# Patient Record
Sex: FEMALE | URBAN - METROPOLITAN AREA
[De-identification: names, ages, dates, MRNs, and addresses within clinical notes are randomized per-mention and may not be internally consistent; named-entity substitution may affect disease eponyms.]

---

## 2024-07-16 ENCOUNTER — HOME VISIT (OUTPATIENT)
Dept: POST ACUTE CARE | Facility: EXTERNAL LOCATION | Age: 74
End: 2024-07-16
Payer: MEDICARE

## 2024-07-16 DIAGNOSIS — E11.40 TYPE 2 DIABETES MELLITUS WITH DIABETIC NEUROPATHY, UNSPECIFIED WHETHER LONG TERM INSULIN USE (MULTI): ICD-10-CM

## 2024-07-16 DIAGNOSIS — I11.9 CARDIOMYOPATHY, HYPERTENSIVE, BENIGN, WITHOUT HEART FAILURE (MULTI): ICD-10-CM

## 2024-07-16 DIAGNOSIS — I48.91 ATRIAL FIBRILLATION, UNSPECIFIED TYPE (MULTI): ICD-10-CM

## 2024-07-16 DIAGNOSIS — I43 CARDIOMYOPATHY, HYPERTENSIVE, BENIGN, WITHOUT HEART FAILURE (MULTI): ICD-10-CM

## 2024-07-16 DIAGNOSIS — N18.30 STAGE 3 CHRONIC KIDNEY DISEASE, UNSPECIFIED WHETHER STAGE 3A OR 3B CKD (MULTI): ICD-10-CM

## 2024-07-16 DIAGNOSIS — F03.B0 MODERATE DEMENTIA, UNSPECIFIED DEMENTIA TYPE, UNSPECIFIED WHETHER BEHAVIORAL, PSYCHOTIC, OR MOOD DISTURBANCE OR ANXIETY (MULTI): Primary | ICD-10-CM

## 2024-07-16 DIAGNOSIS — I63.9 CEREBROVASCULAR ACCIDENT (CVA), UNSPECIFIED MECHANISM (MULTI): ICD-10-CM

## 2024-07-16 PROCEDURE — 99344 HOME/RES VST NEW MOD MDM 60: CPT | Performed by: EMERGENCY MEDICINE

## 2024-07-16 PROCEDURE — 99497 ADVNCD CARE PLAN 30 MIN: CPT | Performed by: EMERGENCY MEDICINE

## 2024-08-08 ENCOUNTER — HOME VISIT (OUTPATIENT)
Dept: POST ACUTE CARE | Facility: EXTERNAL LOCATION | Age: 74
End: 2024-08-08
Payer: MEDICARE

## 2024-08-08 DIAGNOSIS — F03.B0 MODERATE DEMENTIA, UNSPECIFIED DEMENTIA TYPE, UNSPECIFIED WHETHER BEHAVIORAL, PSYCHOTIC, OR MOOD DISTURBANCE OR ANXIETY (MULTI): ICD-10-CM

## 2024-08-08 DIAGNOSIS — E11.40 TYPE 2 DIABETES MELLITUS WITH DIABETIC NEUROPATHY, UNSPECIFIED WHETHER LONG TERM INSULIN USE (MULTI): Primary | ICD-10-CM

## 2024-08-08 DIAGNOSIS — I63.9 CEREBROVASCULAR ACCIDENT (CVA), UNSPECIFIED MECHANISM (MULTI): ICD-10-CM

## 2024-08-08 DIAGNOSIS — I48.91 ATRIAL FIBRILLATION, UNSPECIFIED TYPE (MULTI): ICD-10-CM

## 2024-08-17 NOTE — PROGRESS NOTES
Augustina Cande   73 y.o.  female  @location@            Assessment and Plan:      Moderate to advanced dementia  Past history of stroke  Hypertensive heart disease  CKD stage III  Type 2 diabetes  Paroxysmal A-fib  Osteoporosis    Continue alendronate for osteoporosis    Anticoagulated for A-fib with Eliquis.  Monitor for bleeding    On aspirin for past history of stroke.  Monitor neurological status    Hyperlipidemia-on Lipitor.  Monitor lipid panel    Continue memantine for dementia    Anxiety and depression-on Zoloft    Hypertension-controlled with Cardizem.  Monitor and treat accordingly    -Fall prevention    -Cognitive engagement     -Monitor and treat blood pressure     -Aggressive decubitus ulcer prevention.     -Bowel and bladder care     -Optimal nutrition and supplementation as needed     -GI  and DVT prophylaxis     -Symptom control     -Ambulation as tolerated     -Will follow    Charting is done using voice recognition software and may contain errors which have not been completely corrected        Source of history: Nurse, Medical personnel, Medical record, Patient.  History limitation: None.    HPI:      Patient is unable to give any detailed history and therefore history is obtained from the chart  No acute complaints voiced by the patient or acute concerns raised by nursing    Problem List/Past Medical History Ongoing Degenerative disc disease at L5-S1 level Hyperlipidemia Hypertension Iron deficiency anemia Mild renal insufficiency Osteopenia of hip Postmenopausal status Type 2 diabetes mellitus Historical   No qualifying data Procedure/Surgical History Right carotid endarterectomy: 01/01/10  Cataracts removed bilaterally Medications alendronate 70 mg oral tablet, 70 mg= 1 tabs, ORAL, TUESDAY aspirin 81 mg oral tablet, DAILY Caltrate, ORAL, DAILY Cartia  mg/24 hours oral capsule, extended release, 240 mg= 1 caps, ORAL, DAILY, 1 refills Fish Oil oral capsule, DAILY Glucometer test strips for BID  testing., See Instructions, 11 refills Lipitor 40 mg oral tablet, 40 mg= 1 tabs, ORAL, DAILY, 1 refills metFORMIN 500 mg oral tablet, 1 tabs, ORAL, BID multivitamin, ORAL, DAILY Vitamin B-12, ORAL, DAILY Vitamin D, ORAL, DAILY Allergies No Known Allergies Social History   Alcohol - Denies Alcohol Use, 11/21/2017   Domestic Concerns   No, 11/21/2017   Employment/School   Retired, Work/School description: ., 11/21/2017   Exercise   Exercise type: Aerobics., 11/21/2017   Home/Environment   Lives with Siblings. Living situation: Home/Independent. Home equipment: None, Blood pressure monitor. Home monitoring equipment: None. Special/Community resources: None. Mobility prior to admit: Independent. Home Barriers: None. Will patient require additional/new services upon discharge? No., 11/21/2017    Family History Alcoholism: Father. Alzheimer disease: Mother and Grandmother. Diabetes..: Mother. High blood pressure....: Brother. Immunizations   Vaccine Date Status Comments   pneumococcal 13-valent vaccine 03/16/2020 Given    influenza virus vaccine, inactivated 11/22/2019 Recorded    influenza virus vaccine, inactivated 11/20/2018 Recorded    diphtheria/pertussis, acel/tetanus adult 05/21/2018 Given exp date not right in system, exp date is June 13, 2020   influenza virus vaccine, inactivated 11/21/2017 Given    influenza virus vaccine, inactivated 12/19/2014 Recorded    .   Physical Exam:  Vital signs as per nursing/MA documentation were reviewed  General appearance: Alert and in no acute distress  HEENT: Normal Inspection  Neck - Normal Inspection  Respiratory : No respiratory distress. Lungs are clear   Cardiovascular: heart rate normal. No gallop  Back - normal inspection  Skin inspection:Warm  Musculoskeletal : No deformities  Neuro : Limited exam. Baseline    ROS: Review of symptoms is negative except for what is mentioned in HPI    Results/Data  Records including but not limited to electronic medical  records, relevant lab work and imaging from patient's health care facility encounter were reviewed and independently verified      Charting was completed using voice recognition technology and may include unintended errors.    Discussed with patient/family, RN, and NP.

## 2024-09-17 ENCOUNTER — NURSING HOME VISIT (OUTPATIENT)
Dept: POST ACUTE CARE | Facility: EXTERNAL LOCATION | Age: 74
End: 2024-09-17
Payer: MEDICARE

## 2024-09-17 DIAGNOSIS — I48.91 ATRIAL FIBRILLATION, UNSPECIFIED TYPE (MULTI): ICD-10-CM

## 2024-09-17 DIAGNOSIS — E11.40 TYPE 2 DIABETES MELLITUS WITH DIABETIC NEUROPATHY, UNSPECIFIED WHETHER LONG TERM INSULIN USE (MULTI): ICD-10-CM

## 2024-09-17 DIAGNOSIS — F03.B0 MODERATE DEMENTIA, UNSPECIFIED DEMENTIA TYPE, UNSPECIFIED WHETHER BEHAVIORAL, PSYCHOTIC, OR MOOD DISTURBANCE OR ANXIETY: ICD-10-CM

## 2024-09-17 DIAGNOSIS — I63.9 CEREBROVASCULAR ACCIDENT (CVA), UNSPECIFIED MECHANISM (MULTI): ICD-10-CM

## 2024-09-17 DIAGNOSIS — Z00.00 ENCOUNTER FOR MEDICARE ANNUAL WELLNESS EXAM: Primary | ICD-10-CM

## 2024-09-17 PROCEDURE — 99349 HOME/RES VST EST MOD MDM 40: CPT | Performed by: EMERGENCY MEDICINE

## 2024-09-17 PROCEDURE — G0439 PPPS, SUBSEQ VISIT: HCPCS | Performed by: EMERGENCY MEDICINE

## 2024-09-17 PROCEDURE — 99397 PER PM REEVAL EST PAT 65+ YR: CPT | Performed by: EMERGENCY MEDICINE

## 2024-09-17 PROCEDURE — 99497 ADVNCD CARE PLAN 30 MIN: CPT | Performed by: EMERGENCY MEDICINE

## 2024-09-17 NOTE — LETTER
Patient: Augustina Castellon  : 1950    Encounter Date: 2024    Augustina Castellon   74 y.o.  female  @location@    Patient is being seen for subsequent Medicare wellness and/or physical    Past Medical, Surgical and Family History: reviewed and updated in chart.   Medications and Supplements: Review of all medications by a prescribing practitioner or clinical pharmacist (such as prescriptions, OTCs, herbal therapies and supplements) documented in the medical record.    No, the patient is not using opioids.   Patient Self Assessment of Health Status: fair.   Tobacco use: Non-User The patient is a former cigarette smoker.   Alcohol use: As noted in social history   Illicit drug use: As noted in social history   Current diet: well balanced diet.   Exercise Frequency: the patient does not exercise.   Depression/Suicide Screening:  .   During the past 2 weeks, the patient has not felt down, depressed or hopeless.    During the past 2 weeks, the patient has not felt little interest or pleasure in doing things.    Hearing Impairment: Patient has slight hearing impairment.   Cognitive Impairment: Cognitive impairment was observed.      Bathing: dependent.   Dressing: dependent.   Walking: dependent.   Toileting: dependent.   Feeding: dependent.   Personal Hygiene: dependent.   Managing Finances: dependent.   Shopping: dependent.   Managing Medications: dependent.   Housework / Basic Home Maintenance: dependent.   Handling Transportation: dependent.   Preparing Meals: dependent.   Using the Telephone/ Communication Devices: dependent.    Falls Risk Screening:. Has not fallen in the last 6 months.   Home safety risk factors: none.   Advance directives:. Advanced Care Planning discussed and documented advance care plan or surrogate decision maker documented in the medical record.   A Conversation about Advance directives of at least 16 minutes has occurred. Actual time spent: 20   Topics discussed: Code status per nursing  documentation filed with facility.              Assessment and Plan:      Moderate to advanced dementia  Past history of stroke  Hypertensive heart disease  CKD stage III  Type 2 diabetes  Paroxysmal A-fib  Osteoporosis    Continue alendronate for osteoporosis    Anticoagulated for A-fib with Eliquis.  Monitor for bleeding    On aspirin for past history of stroke.  Monitor neurological status    Hyperlipidemia-on Lipitor.  Monitor lipid panel    Continue memantine for dementia    Anxiety and depression-on Zoloft    Hypertension-controlled with Cardizem.  Monitor and treat accordingly    Provide safe environment for the patient    Continue current medication regimen    OT PT and speech therapy    Bowel and bladder,skin care    Nutritional support    Monitor and treat blood glucose    GI  and DVT prophylaxis    PRN medications    Periodic lab work    Regular Follow up          Source of history: Nurse, Medical personnel, Medical record, Patient.  History limitation: None.    HPI:      Patient is unable to give any detailed history and therefore history is obtained from the chart  No acute complaints voiced by the patient or acute concerns raised by nursing    Problem List/Past Medical History Ongoing Degenerative disc disease at L5-S1 level Hyperlipidemia Hypertension Iron deficiency anemia Mild renal insufficiency Osteopenia of hip Postmenopausal status Type 2 diabetes mellitus Historical   No qualifying data Procedure/Surgical History Right carotid endarterectomy: 01/01/10  Cataracts removed bilaterally Medications alendronate 70 mg oral tablet, 70 mg= 1 tabs, ORAL, TUESDAY aspirin 81 mg oral tablet, DAILY Caltrate, ORAL, DAILY Cartia  mg/24 hours oral capsule, extended release, 240 mg= 1 caps, ORAL, DAILY, 1 refills Fish Oil oral capsule, DAILY Glucometer test strips for BID testing., See Instructions, 11 refills Lipitor 40 mg oral tablet, 40 mg= 1 tabs, ORAL, DAILY, 1 refills metFORMIN 500 mg oral tablet, 1  tabs, ORAL, BID multivitamin, ORAL, DAILY Vitamin B-12, ORAL, DAILY Vitamin D, ORAL, DAILY Allergies No Known Allergies Social History   Alcohol - Denies Alcohol Use, 11/21/2017   Domestic Concerns   No, 11/21/2017   Employment/School   Retired, Work/School description: ., 11/21/2017   Exercise   Exercise type: Aerobics., 11/21/2017   Home/Environment   Lives with Siblings. Living situation: Home/Independent. Home equipment: None, Blood pressure monitor. Home monitoring equipment: None. Special/Community resources: None. Mobility prior to admit: Independent. Home Barriers: None. Will patient require additional/new services upon discharge? No., 11/21/2017    Family History Alcoholism: Father. Alzheimer disease: Mother and Grandmother. Diabetes..: Mother. High blood pressure....: Brother. Immunizations   Vaccine Date Status Comments   pneumococcal 13-valent vaccine 03/16/2020 Given    influenza virus vaccine, inactivated 11/22/2019 Recorded    influenza virus vaccine, inactivated 11/20/2018 Recorded    diphtheria/pertussis, acel/tetanus adult 05/21/2018 Given exp date not right in system, exp date is June 13, 2020   influenza virus vaccine, inactivated 11/21/2017 Given    influenza virus vaccine, inactivated 12/19/2014 Recorded    .   Physical Exam:  Vital signs as per nursing/MA documentation were reviewed  General appearance: Alert and in no acute distress  HEENT: Normal Inspection  Neck - Normal Inspection  Respiratory : No respiratory distress. Lungs are clear   Cardiovascular: heart rate normal. No gallop  Back - normal inspection  Skin inspection:Warm  Musculoskeletal : No deformities  Neuro : Limited exam. Baseline    ROS: Review of symptoms is negative except for what is mentioned in HPI    Results/Data  Records including but not limited to electronic medical records, relevant lab work and imaging from patient's health care facility encounter were reviewed and independently verified      Charting was  completed using voice recognition technology and may include unintended errors.    Discussed with patient/family, RN, and NP.      Electronically Signed By: Reddy Taylor MD   9/24/24  4:42 PM

## 2024-09-19 NOTE — PROGRESS NOTES
Augustina Castellon   74 y.o.  female  @location@    Patient is being seen for subsequent Medicare wellness and/or physical    Past Medical, Surgical and Family History: reviewed and updated in chart.   Medications and Supplements: Review of all medications by a prescribing practitioner or clinical pharmacist (such as prescriptions, OTCs, herbal therapies and supplements) documented in the medical record.    No, the patient is not using opioids.   Patient Self Assessment of Health Status: fair.   Tobacco use: Non-User The patient is a former cigarette smoker.   Alcohol use: As noted in social history   Illicit drug use: As noted in social history   Current diet: well balanced diet.   Exercise Frequency: the patient does not exercise.   Depression/Suicide Screening:  .   During the past 2 weeks, the patient has not felt down, depressed or hopeless.    During the past 2 weeks, the patient has not felt little interest or pleasure in doing things.    Hearing Impairment: Patient has slight hearing impairment.   Cognitive Impairment: Cognitive impairment was observed.      Bathing: dependent.   Dressing: dependent.   Walking: dependent.   Toileting: dependent.   Feeding: dependent.   Personal Hygiene: dependent.   Managing Finances: dependent.   Shopping: dependent.   Managing Medications: dependent.   Housework / Basic Home Maintenance: dependent.   Handling Transportation: dependent.   Preparing Meals: dependent.   Using the Telephone/ Communication Devices: dependent.    Falls Risk Screening:. Has not fallen in the last 6 months.   Home safety risk factors: none.   Advance directives:. Advanced Care Planning discussed and documented advance care plan or surrogate decision maker documented in the medical record.   A Conversation about Advance directives of at least 16 minutes has occurred. Actual time spent: 20   Topics discussed: Code status per nursing documentation filed with facility.              Assessment and  Plan:      Moderate to advanced dementia  Past history of stroke  Hypertensive heart disease  CKD stage III  Type 2 diabetes  Paroxysmal A-fib  Osteoporosis    Continue alendronate for osteoporosis    Anticoagulated for A-fib with Eliquis.  Monitor for bleeding    On aspirin for past history of stroke.  Monitor neurological status    Hyperlipidemia-on Lipitor.  Monitor lipid panel    Continue memantine for dementia    Anxiety and depression-on Zoloft    Hypertension-controlled with Cardizem.  Monitor and treat accordingly    Provide safe environment for the patient    Continue current medication regimen    OT PT and speech therapy    Bowel and bladder,skin care    Nutritional support    Monitor and treat blood glucose    GI  and DVT prophylaxis    PRN medications    Periodic lab work    Regular Follow up          Source of history: Nurse, Medical personnel, Medical record, Patient.  History limitation: None.    HPI:      Patient is unable to give any detailed history and therefore history is obtained from the chart  No acute complaints voiced by the patient or acute concerns raised by nursing    Problem List/Past Medical History Ongoing Degenerative disc disease at L5-S1 level Hyperlipidemia Hypertension Iron deficiency anemia Mild renal insufficiency Osteopenia of hip Postmenopausal status Type 2 diabetes mellitus Historical   No qualifying data Procedure/Surgical History Right carotid endarterectomy: 01/01/10  Cataracts removed bilaterally Medications alendronate 70 mg oral tablet, 70 mg= 1 tabs, ORAL, TUESDAY aspirin 81 mg oral tablet, DAILY Caltrate, ORAL, DAILY Cartia  mg/24 hours oral capsule, extended release, 240 mg= 1 caps, ORAL, DAILY, 1 refills Fish Oil oral capsule, DAILY Glucometer test strips for BID testing., See Instructions, 11 refills Lipitor 40 mg oral tablet, 40 mg= 1 tabs, ORAL, DAILY, 1 refills metFORMIN 500 mg oral tablet, 1 tabs, ORAL, BID multivitamin, ORAL, DAILY Vitamin B-12, ORAL,  DAILY Vitamin D, ORAL, DAILY Allergies No Known Allergies Social History   Alcohol - Denies Alcohol Use, 11/21/2017   Domestic Concerns   No, 11/21/2017   Employment/School   Retired, Work/School description: ., 11/21/2017   Exercise   Exercise type: Aerobics., 11/21/2017   Home/Environment   Lives with Siblings. Living situation: Home/Independent. Home equipment: None, Blood pressure monitor. Home monitoring equipment: None. Special/Community resources: None. Mobility prior to admit: Independent. Home Barriers: None. Will patient require additional/new services upon discharge? No., 11/21/2017    Family History Alcoholism: Father. Alzheimer disease: Mother and Grandmother. Diabetes..: Mother. High blood pressure....: Brother. Immunizations   Vaccine Date Status Comments   pneumococcal 13-valent vaccine 03/16/2020 Given    influenza virus vaccine, inactivated 11/22/2019 Recorded    influenza virus vaccine, inactivated 11/20/2018 Recorded    diphtheria/pertussis, acel/tetanus adult 05/21/2018 Given exp date not right in system, exp date is June 13, 2020   influenza virus vaccine, inactivated 11/21/2017 Given    influenza virus vaccine, inactivated 12/19/2014 Recorded    .   Physical Exam:  Vital signs as per nursing/MA documentation were reviewed  General appearance: Alert and in no acute distress  HEENT: Normal Inspection  Neck - Normal Inspection  Respiratory : No respiratory distress. Lungs are clear   Cardiovascular: heart rate normal. No gallop  Back - normal inspection  Skin inspection:Warm  Musculoskeletal : No deformities  Neuro : Limited exam. Baseline    ROS: Review of symptoms is negative except for what is mentioned in HPI    Results/Data  Records including but not limited to electronic medical records, relevant lab work and imaging from patient's health care facility encounter were reviewed and independently verified      Charting was completed using voice recognition technology and may include  unintended errors.    Discussed with patient/family, RN, and NP.

## 2024-10-15 ENCOUNTER — NURSING HOME VISIT (OUTPATIENT)
Dept: POST ACUTE CARE | Facility: EXTERNAL LOCATION | Age: 74
End: 2024-10-15
Payer: COMMERCIAL

## 2024-10-15 DIAGNOSIS — I48.91 ATRIAL FIBRILLATION, UNSPECIFIED TYPE (MULTI): ICD-10-CM

## 2024-10-15 DIAGNOSIS — I11.9 CARDIOMYOPATHY, HYPERTENSIVE, BENIGN, WITHOUT HEART FAILURE: ICD-10-CM

## 2024-10-15 DIAGNOSIS — F03.B0 MODERATE DEMENTIA, UNSPECIFIED DEMENTIA TYPE, UNSPECIFIED WHETHER BEHAVIORAL, PSYCHOTIC, OR MOOD DISTURBANCE OR ANXIETY: ICD-10-CM

## 2024-10-15 DIAGNOSIS — I63.9 CEREBROVASCULAR ACCIDENT (CVA), UNSPECIFIED MECHANISM (MULTI): Primary | ICD-10-CM

## 2024-10-15 DIAGNOSIS — I43 CARDIOMYOPATHY, HYPERTENSIVE, BENIGN, WITHOUT HEART FAILURE: ICD-10-CM

## 2024-10-15 PROCEDURE — 99348 HOME/RES VST EST LOW MDM 30: CPT | Performed by: EMERGENCY MEDICINE

## 2024-10-15 NOTE — LETTER
Patient: Augustina Castellon  : 1950    Encounter Date: 10/15/2024    Augustina Castellon   74 y.o.  female  @location@            Assessment and Plan:      Moderate to advanced dementia  Past history of stroke  Hypertensive heart disease  CKD stage III  Type 2 diabetes  Paroxysmal A-fib  Osteoporosis    Continue alendronate for osteoporosis    Anticoagulated for A-fib with Eliquis.  Monitor for bleeding    On aspirin for past history of stroke.  Monitor neurological status    Hyperlipidemia-on Lipitor.  Monitor lipid panel    Continue memantine for dementia    Anxiety and depression-on Zoloft    Hypertension-controlled with Cardizem.  Monitor and treat accordingly    Rx list reviewed.   PT and OT evaluation is in the process.   Routine safety measures, fall precautions, risk modification and alarm placement if needed for prevention of falls.   Skin care precautions, prevention of pressures sores at pressure points assessed.   Pt needs to be monitored frequently by nursing staff particularly at night time.   Any confusion, agitation or behavioural disturbance needs to be attended, as per home policy   rapid covid Ag assay need to be done, notify if positive.   If needed appropriate measures to be taken for alarm placements and assisted devices, pt was told not to get up and ambulate at night unless help and assist available at bedside,   labs will be done as per our routine protocol.   PO intake need to be monitored if consuming po.       Charting is done using voice recognition software and may contain errors which have not been completely corrected          Source of history: Nurse, Medical personnel, Medical record, Patient.  History limitation: None.    HPI:      Patient is unable to give any detailed history and therefore history is obtained from the chart  No acute complaints voiced by the patient or acute concerns raised by nursing    Problem List/Past Medical History Ongoing Degenerative disc disease at L5-S1  level Hyperlipidemia Hypertension Iron deficiency anemia Mild renal insufficiency Osteopenia of hip Postmenopausal status Type 2 diabetes mellitus Historical   No qualifying data Procedure/Surgical History Right carotid endarterectomy: 01/01/10  Cataracts removed bilaterally Medications alendronate 70 mg oral tablet, 70 mg= 1 tabs, ORAL, TUESDAY aspirin 81 mg oral tablet, DAILY Caltrate, ORAL, DAILY Cartia  mg/24 hours oral capsule, extended release, 240 mg= 1 caps, ORAL, DAILY, 1 refills Fish Oil oral capsule, DAILY Glucometer test strips for BID testing., See Instructions, 11 refills Lipitor 40 mg oral tablet, 40 mg= 1 tabs, ORAL, DAILY, 1 refills metFORMIN 500 mg oral tablet, 1 tabs, ORAL, BID multivitamin, ORAL, DAILY Vitamin B-12, ORAL, DAILY Vitamin D, ORAL, DAILY Allergies No Known Allergies Social History   Alcohol - Denies Alcohol Use, 11/21/2017   Domestic Concerns   No, 11/21/2017   Employment/School   Retired, Work/School description: ., 11/21/2017   Exercise   Exercise type: Aerobics., 11/21/2017   Home/Environment   Lives with Siblings. Living situation: Home/Independent. Home equipment: None, Blood pressure monitor. Home monitoring equipment: None. Special/Community resources: None. Mobility prior to admit: Independent. Home Barriers: None. Will patient require additional/new services upon discharge? No., 11/21/2017    Family History Alcoholism: Father. Alzheimer disease: Mother and Grandmother. Diabetes..: Mother. High blood pressure....: Brother. Immunizations   Vaccine Date Status Comments   pneumococcal 13-valent vaccine 03/16/2020 Given    influenza virus vaccine, inactivated 11/22/2019 Recorded    influenza virus vaccine, inactivated 11/20/2018 Recorded    diphtheria/pertussis, acel/tetanus adult 05/21/2018 Given exp date not right in system, exp date is June 13, 2020   influenza virus vaccine, inactivated 11/21/2017 Given    influenza virus vaccine, inactivated 12/19/2014  Recorded    .   Physical Exam:  Vital signs as per nursing/MA documentation were reviewed  General appearance: Alert and in no acute distress  HEENT: Normal Inspection  Neck - Normal Inspection  Respiratory : No respiratory distress. Lungs are clear   Cardiovascular: heart rate normal. No gallop  Back - normal inspection  Skin inspection:Warm  Musculoskeletal : No deformities  Neuro : Limited exam. Baseline    ROS: Review of symptoms is negative except for what is mentioned in HPI    Results/Data  Records including but not limited to electronic medical records, relevant lab work and imaging from patient's health care facility encounter were reviewed and independently verified      Charting was completed using voice recognition technology and may include unintended errors.    Discussed with patient/family, RN, and NP.      Electronically Signed By: Reddy Taylor MD   10/27/24  5:32 PM

## 2024-10-21 NOTE — PROGRESS NOTES
Augustina Castellon   74 y.o.  female  @location@            Assessment and Plan:      Moderate to advanced dementia  Past history of stroke  Hypertensive heart disease  CKD stage III  Type 2 diabetes  Paroxysmal A-fib  Osteoporosis    Continue alendronate for osteoporosis    Anticoagulated for A-fib with Eliquis.  Monitor for bleeding    On aspirin for past history of stroke.  Monitor neurological status    Hyperlipidemia-on Lipitor.  Monitor lipid panel    Continue memantine for dementia    Anxiety and depression-on Zoloft    Hypertension-controlled with Cardizem.  Monitor and treat accordingly    Rx list reviewed.   PT and OT evaluation is in the process.   Routine safety measures, fall precautions, risk modification and alarm placement if needed for prevention of falls.   Skin care precautions, prevention of pressures sores at pressure points assessed.   Pt needs to be monitored frequently by nursing staff particularly at night time.   Any confusion, agitation or behavioural disturbance needs to be attended, as per home policy   rapid covid Ag assay need to be done, notify if positive.   If needed appropriate measures to be taken for alarm placements and assisted devices, pt was told not to get up and ambulate at night unless help and assist available at bedside,   labs will be done as per our routine protocol.   PO intake need to be monitored if consuming po.       Charting is done using voice recognition software and may contain errors which have not been completely corrected          Source of history: Nurse, Medical personnel, Medical record, Patient.  History limitation: None.    HPI:      Patient is unable to give any detailed history and therefore history is obtained from the chart  No acute complaints voiced by the patient or acute concerns raised by nursing    Problem List/Past Medical History Ongoing Degenerative disc disease at L5-S1 level Hyperlipidemia Hypertension Iron deficiency anemia Mild renal  insufficiency Osteopenia of hip Postmenopausal status Type 2 diabetes mellitus Historical   No qualifying data Procedure/Surgical History Right carotid endarterectomy: 01/01/10  Cataracts removed bilaterally Medications alendronate 70 mg oral tablet, 70 mg= 1 tabs, ORAL, TUESDAY aspirin 81 mg oral tablet, DAILY Caltrate, ORAL, DAILY Cartia  mg/24 hours oral capsule, extended release, 240 mg= 1 caps, ORAL, DAILY, 1 refills Fish Oil oral capsule, DAILY Glucometer test strips for BID testing., See Instructions, 11 refills Lipitor 40 mg oral tablet, 40 mg= 1 tabs, ORAL, DAILY, 1 refills metFORMIN 500 mg oral tablet, 1 tabs, ORAL, BID multivitamin, ORAL, DAILY Vitamin B-12, ORAL, DAILY Vitamin D, ORAL, DAILY Allergies No Known Allergies Social History   Alcohol - Denies Alcohol Use, 11/21/2017   Domestic Concerns   No, 11/21/2017   Employment/School   Retired, Work/School description: ., 11/21/2017   Exercise   Exercise type: Aerobics., 11/21/2017   Home/Environment   Lives with Siblings. Living situation: Home/Independent. Home equipment: None, Blood pressure monitor. Home monitoring equipment: None. Special/Community resources: None. Mobility prior to admit: Independent. Home Barriers: None. Will patient require additional/new services upon discharge? No., 11/21/2017    Family History Alcoholism: Father. Alzheimer disease: Mother and Grandmother. Diabetes..: Mother. High blood pressure....: Brother. Immunizations   Vaccine Date Status Comments   pneumococcal 13-valent vaccine 03/16/2020 Given    influenza virus vaccine, inactivated 11/22/2019 Recorded    influenza virus vaccine, inactivated 11/20/2018 Recorded    diphtheria/pertussis, acel/tetanus adult 05/21/2018 Given exp date not right in system, exp date is June 13, 2020   influenza virus vaccine, inactivated 11/21/2017 Given    influenza virus vaccine, inactivated 12/19/2014 Recorded    .   Physical Exam:  Vital signs as per nursing/MA  documentation were reviewed  General appearance: Alert and in no acute distress  HEENT: Normal Inspection  Neck - Normal Inspection  Respiratory : No respiratory distress. Lungs are clear   Cardiovascular: heart rate normal. No gallop  Back - normal inspection  Skin inspection:Warm  Musculoskeletal : No deformities  Neuro : Limited exam. Baseline    ROS: Review of symptoms is negative except for what is mentioned in HPI    Results/Data  Records including but not limited to electronic medical records, relevant lab work and imaging from patient's health care facility encounter were reviewed and independently verified      Charting was completed using voice recognition technology and may include unintended errors.    Discussed with patient/family, RN, and NP.

## 2024-11-07 ENCOUNTER — NURSING HOME VISIT (OUTPATIENT)
Dept: POST ACUTE CARE | Facility: EXTERNAL LOCATION | Age: 74
End: 2024-11-07
Payer: COMMERCIAL

## 2024-11-07 DIAGNOSIS — I11.9 CARDIOMYOPATHY, HYPERTENSIVE, BENIGN, WITHOUT HEART FAILURE: ICD-10-CM

## 2024-11-07 DIAGNOSIS — I63.9 CEREBROVASCULAR ACCIDENT (CVA), UNSPECIFIED MECHANISM (MULTI): Primary | ICD-10-CM

## 2024-11-07 DIAGNOSIS — F03.B0 MODERATE DEMENTIA, UNSPECIFIED DEMENTIA TYPE, UNSPECIFIED WHETHER BEHAVIORAL, PSYCHOTIC, OR MOOD DISTURBANCE OR ANXIETY: ICD-10-CM

## 2024-11-07 DIAGNOSIS — I48.91 ATRIAL FIBRILLATION, UNSPECIFIED TYPE (MULTI): ICD-10-CM

## 2024-11-07 DIAGNOSIS — I43 CARDIOMYOPATHY, HYPERTENSIVE, BENIGN, WITHOUT HEART FAILURE: ICD-10-CM

## 2024-11-07 PROCEDURE — 99349 HOME/RES VST EST MOD MDM 40: CPT | Performed by: EMERGENCY MEDICINE

## 2024-11-07 NOTE — LETTER
Patient: Augustina Castellon  : 1950    Encounter Date: 2024    Augustina Castellon   74 y.o.  female  @location@            Assessment and Plan:      Moderate to advanced dementia  Past history of stroke  Hypertensive heart disease  CKD stage III  Type 2 diabetes  Paroxysmal A-fib  Osteoporosis    Continue alendronate for osteoporosis    Anticoagulated for A-fib with Eliquis.  Monitor for bleeding    On aspirin for past history of stroke.  Monitor neurological status    Hyperlipidemia-on Lipitor.  Monitor lipid panel    Continue memantine for dementia    Anxiety and depression-on Zoloft    Hypertension-controlled with Cardizem.  Monitor and treat accordingly    1. medications are reviewed      2. Continue with rehabilitative, supportive, and or restorative care as ordered and as the patient tolerates     3. Laboratory evaluations will be monitored on an ongoing as needed basis     4. Medications have been cross-referenced with the patient's diagnoses list, and medications reductions have been considered and/or implemented.     5. Pharmacy recommendations are addressed on an ongoing as needed basis.     6. Controlled substances have been electronically scripted every 60 days for opiates and others of similar schedule, and every 6 months for sedative/hypnotics and others of similar schedule.     7. Nursing has been queried about any potential adverse events that need to be reported to me.    Salient information and adjustment of care plan pertaining to this individual patient interaction today are the following:      A. We will continue with restorative and supportive care as the patient tolerates    B. Laboratory examinations will continue to be drawn on an ongoing as-needed basis. The patient's weight needs to be monitored and if needed we may need to institute appetite stimulating medication    C. The patient's long term prognosis is guarded    Charting is done using voice recognition software and may contain  errors which may not have been completely corrected          Source of history: Nurse, Medical personnel, Medical record, Patient.  History limitation: None.    HPI:      Patient is unable to give any detailed history and therefore history is obtained from the chart  No acute complaints voiced by the patient or acute concerns raised by nursing    Problem List/Past Medical History Ongoing Degenerative disc disease at L5-S1 level Hyperlipidemia Hypertension Iron deficiency anemia Mild renal insufficiency Osteopenia of hip Postmenopausal status Type 2 diabetes mellitus Historical   No qualifying data Procedure/Surgical History Right carotid endarterectomy: 01/01/10  Cataracts removed bilaterally Medications alendronate 70 mg oral tablet, 70 mg= 1 tabs, ORAL, TUESDAY aspirin 81 mg oral tablet, DAILY Caltrate, ORAL, DAILY Cartia  mg/24 hours oral capsule, extended release, 240 mg= 1 caps, ORAL, DAILY, 1 refills Fish Oil oral capsule, DAILY Glucometer test strips for BID testing., See Instructions, 11 refills Lipitor 40 mg oral tablet, 40 mg= 1 tabs, ORAL, DAILY, 1 refills metFORMIN 500 mg oral tablet, 1 tabs, ORAL, BID multivitamin, ORAL, DAILY Vitamin B-12, ORAL, DAILY Vitamin D, ORAL, DAILY Allergies No Known Allergies Social History   Alcohol - Denies Alcohol Use, 11/21/2017   Domestic Concerns   No, 11/21/2017   Employment/School   Retired, Work/School description: ., 11/21/2017   Exercise   Exercise type: Aerobics., 11/21/2017   Home/Environment   Lives with Siblings. Living situation: Home/Independent. Home equipment: None, Blood pressure monitor. Home monitoring equipment: None. Special/Community resources: None. Mobility prior to admit: Independent. Home Barriers: None. Will patient require additional/new services upon discharge? No., 11/21/2017    Family History Alcoholism: Father. Alzheimer disease: Mother and Grandmother. Diabetes..: Mother. High blood pressure....: Brother. Immunizations    Vaccine Date Status Comments   pneumococcal 13-valent vaccine 03/16/2020 Given    influenza virus vaccine, inactivated 11/22/2019 Recorded    influenza virus vaccine, inactivated 11/20/2018 Recorded    diphtheria/pertussis, acel/tetanus adult 05/21/2018 Given exp date not right in system, exp date is June 13, 2020   influenza virus vaccine, inactivated 11/21/2017 Given    influenza virus vaccine, inactivated 12/19/2014 Recorded    .   Physical Exam:  Vital signs as per nursing/MA documentation were reviewed  General appearance: Alert and in no acute distress  HEENT: Normal Inspection  Neck - Normal Inspection  Respiratory : No respiratory distress. Lungs are clear   Cardiovascular: heart rate normal. No gallop  Back - normal inspection  Skin inspection:Warm  Musculoskeletal : No deformities  Neuro : Limited exam. Baseline    ROS: Review of symptoms is negative except for what is mentioned in HPI    Results/Data  Records including but not limited to electronic medical records, relevant lab work and imaging from patient's health care facility encounter were reviewed and independently verified      Charting was completed using voice recognition technology and may include unintended errors.    Discussed with patient/family, RN, and NP.      Electronically Signed By: Reddy Taylor MD   11/24/24  7:04 AM

## 2024-11-11 NOTE — PROGRESS NOTES
Augustina Castellon   74 y.o.  female  @location@            Assessment and Plan:      Moderate to advanced dementia  Past history of stroke  Hypertensive heart disease  CKD stage III  Type 2 diabetes  Paroxysmal A-fib  Osteoporosis    Continue alendronate for osteoporosis    Anticoagulated for A-fib with Eliquis.  Monitor for bleeding    On aspirin for past history of stroke.  Monitor neurological status    Hyperlipidemia-on Lipitor.  Monitor lipid panel    Continue memantine for dementia    Anxiety and depression-on Zoloft    Hypertension-controlled with Cardizem.  Monitor and treat accordingly    1. medications are reviewed      2. Continue with rehabilitative, supportive, and or restorative care as ordered and as the patient tolerates     3. Laboratory evaluations will be monitored on an ongoing as needed basis     4. Medications have been cross-referenced with the patient's diagnoses list, and medications reductions have been considered and/or implemented.     5. Pharmacy recommendations are addressed on an ongoing as needed basis.     6. Controlled substances have been electronically scripted every 60 days for opiates and others of similar schedule, and every 6 months for sedative/hypnotics and others of similar schedule.     7. Nursing has been queried about any potential adverse events that need to be reported to me.    Salient information and adjustment of care plan pertaining to this individual patient interaction today are the following:      A. We will continue with restorative and supportive care as the patient tolerates    B. Laboratory examinations will continue to be drawn on an ongoing as-needed basis. The patient's weight needs to be monitored and if needed we may need to institute appetite stimulating medication    C. The patient's long term prognosis is guarded    Charting is done using voice recognition software and may contain errors which may not have been completely corrected          Source of  history: Nurse, Medical personnel, Medical record, Patient.  History limitation: None.    HPI:      Patient is unable to give any detailed history and therefore history is obtained from the chart  No acute complaints voiced by the patient or acute concerns raised by nursing    Problem List/Past Medical History Ongoing Degenerative disc disease at L5-S1 level Hyperlipidemia Hypertension Iron deficiency anemia Mild renal insufficiency Osteopenia of hip Postmenopausal status Type 2 diabetes mellitus Historical   No qualifying data Procedure/Surgical History Right carotid endarterectomy: 01/01/10  Cataracts removed bilaterally Medications alendronate 70 mg oral tablet, 70 mg= 1 tabs, ORAL, TUESDAY aspirin 81 mg oral tablet, DAILY Caltrate, ORAL, DAILY Cartia  mg/24 hours oral capsule, extended release, 240 mg= 1 caps, ORAL, DAILY, 1 refills Fish Oil oral capsule, DAILY Glucometer test strips for BID testing., See Instructions, 11 refills Lipitor 40 mg oral tablet, 40 mg= 1 tabs, ORAL, DAILY, 1 refills metFORMIN 500 mg oral tablet, 1 tabs, ORAL, BID multivitamin, ORAL, DAILY Vitamin B-12, ORAL, DAILY Vitamin D, ORAL, DAILY Allergies No Known Allergies Social History   Alcohol - Denies Alcohol Use, 11/21/2017   Domestic Concerns   No, 11/21/2017   Employment/School   Retired, Work/School description: ., 11/21/2017   Exercise   Exercise type: Aerobics., 11/21/2017   Home/Environment   Lives with Siblings. Living situation: Home/Independent. Home equipment: None, Blood pressure monitor. Home monitoring equipment: None. Special/Community resources: None. Mobility prior to admit: Independent. Home Barriers: None. Will patient require additional/new services upon discharge? No., 11/21/2017    Family History Alcoholism: Father. Alzheimer disease: Mother and Grandmother. Diabetes..: Mother. High blood pressure....: Brother. Immunizations   Vaccine Date Status Comments   pneumococcal 13-valent vaccine 03/16/2020  Given    influenza virus vaccine, inactivated 11/22/2019 Recorded    influenza virus vaccine, inactivated 11/20/2018 Recorded    diphtheria/pertussis, acel/tetanus adult 05/21/2018 Given exp date not right in system, exp date is June 13, 2020   influenza virus vaccine, inactivated 11/21/2017 Given    influenza virus vaccine, inactivated 12/19/2014 Recorded    .   Physical Exam:  Vital signs as per nursing/MA documentation were reviewed  General appearance: Alert and in no acute distress  HEENT: Normal Inspection  Neck - Normal Inspection  Respiratory : No respiratory distress. Lungs are clear   Cardiovascular: heart rate normal. No gallop  Back - normal inspection  Skin inspection:Warm  Musculoskeletal : No deformities  Neuro : Limited exam. Baseline    ROS: Review of symptoms is negative except for what is mentioned in HPI    Results/Data  Records including but not limited to electronic medical records, relevant lab work and imaging from patient's health care facility encounter were reviewed and independently verified      Charting was completed using voice recognition technology and may include unintended errors.    Discussed with patient/family, RN, and NP.

## 2025-01-16 ENCOUNTER — NURSING HOME VISIT (OUTPATIENT)
Dept: POST ACUTE CARE | Facility: EXTERNAL LOCATION | Age: 75
End: 2025-01-16
Payer: COMMERCIAL

## 2025-01-16 DIAGNOSIS — I63.9 CEREBROVASCULAR ACCIDENT (CVA), UNSPECIFIED MECHANISM (MULTI): Primary | ICD-10-CM

## 2025-01-16 DIAGNOSIS — I43 CARDIOMYOPATHY, HYPERTENSIVE, BENIGN, WITHOUT HEART FAILURE: ICD-10-CM

## 2025-01-16 DIAGNOSIS — I11.9 CARDIOMYOPATHY, HYPERTENSIVE, BENIGN, WITHOUT HEART FAILURE: ICD-10-CM

## 2025-01-16 DIAGNOSIS — F03.B0 MODERATE DEMENTIA, UNSPECIFIED DEMENTIA TYPE, UNSPECIFIED WHETHER BEHAVIORAL, PSYCHOTIC, OR MOOD DISTURBANCE OR ANXIETY: ICD-10-CM

## 2025-01-16 DIAGNOSIS — E11.40 TYPE 2 DIABETES MELLITUS WITH DIABETIC NEUROPATHY, UNSPECIFIED WHETHER LONG TERM INSULIN USE (MULTI): ICD-10-CM

## 2025-01-16 PROCEDURE — 99349 HOME/RES VST EST MOD MDM 40: CPT | Performed by: EMERGENCY MEDICINE

## 2025-01-16 NOTE — LETTER
Patient: Augustina Castellon  : 1950    Encounter Date: 2025    Augustina Castellon   74 y.o.  female  @location@            Assessment and Plan:      Moderate to advanced dementia  Past history of stroke  Hypertensive heart disease  CKD stage III  Type 2 diabetes  Paroxysmal A-fib  Osteoporosis    Continue alendronate for osteoporosis    Anticoagulated for A-fib with Eliquis.  Monitor for bleeding    On aspirin for past history of stroke.  Monitor neurological status    Hyperlipidemia-on Lipitor.  Monitor lipid panel    Continue memantine for dementia    Anxiety and depression-on Zoloft    Hypertension-controlled with Cardizem.  Monitor and treat accordingly    This patient was seen for my regular monthly visit, nursing evaluations and nursing notes were reviewed, interim events are reviewed, interim concerns and messages were reviewed as we have communicated with nursing staff.  Any issues with the falls, skin care impairment, declining physical condition are reviewed and noted, diagnosis list were reviewed, list of medications were reviewed, living will related issues were reviewed, overall patient has been doing well, any declining in patient's condition or any change in patient's condition needs to be notified to physician promptly, discussed with nursing staff, if needed would communicate with family.  Patient stays confined here at the facility for long-term care, there are always concerns about long-term care related issues and concerns.  Nursing staff is trying their best to keep patient safe, all sort of measures has been taken to keep patient safe and comfortable.             Source of history: Nurse, Medical personnel, Medical record, Patient.  History limitation: None.    HPI:      Patient is unable to give any detailed history and therefore history is obtained from the chart  No acute complaints voiced by the patient or acute concerns raised by nursing    Problem List/Past Medical History Ongoing  Degenerative disc disease at L5-S1 level Hyperlipidemia Hypertension Iron deficiency anemia Mild renal insufficiency Osteopenia of hip Postmenopausal status Type 2 diabetes mellitus Historical   No qualifying data Procedure/Surgical History Right carotid endarterectomy: 01/01/10  Cataracts removed bilaterally Medications alendronate 70 mg oral tablet, 70 mg= 1 tabs, ORAL, TUESDAY aspirin 81 mg oral tablet, DAILY Caltrate, ORAL, DAILY Cartia  mg/24 hours oral capsule, extended release, 240 mg= 1 caps, ORAL, DAILY, 1 refills Fish Oil oral capsule, DAILY Glucometer test strips for BID testing., See Instructions, 11 refills Lipitor 40 mg oral tablet, 40 mg= 1 tabs, ORAL, DAILY, 1 refills metFORMIN 500 mg oral tablet, 1 tabs, ORAL, BID multivitamin, ORAL, DAILY Vitamin B-12, ORAL, DAILY Vitamin D, ORAL, DAILY Allergies No Known Allergies Social History   Alcohol - Denies Alcohol Use, 11/21/2017   Domestic Concerns   No, 11/21/2017   Employment/School   Retired, Work/School description: ., 11/21/2017   Exercise   Exercise type: Aerobics., 11/21/2017   Home/Environment   Lives with Siblings. Living situation: Home/Independent. Home equipment: None, Blood pressure monitor. Home monitoring equipment: None. Special/Community resources: None. Mobility prior to admit: Independent. Home Barriers: None. Will patient require additional/new services upon discharge? No., 11/21/2017    Family History Alcoholism: Father. Alzheimer disease: Mother and Grandmother. Diabetes..: Mother. High blood pressure....: Brother. Immunizations   Vaccine Date Status Comments   pneumococcal 13-valent vaccine 03/16/2020 Given    influenza virus vaccine, inactivated 11/22/2019 Recorded    influenza virus vaccine, inactivated 11/20/2018 Recorded    diphtheria/pertussis, acel/tetanus adult 05/21/2018 Given exp date not right in system, exp date is June 13, 2020   influenza virus vaccine, inactivated 11/21/2017 Given    influenza virus  vaccine, inactivated 12/19/2014 Recorded    .   Physical Exam:  Vital signs as per nursing/MA documentation were reviewed  General appearance: Alert and in no acute distress  HEENT: Normal Inspection  Neck - Normal Inspection  Respiratory : No respiratory distress. Lungs are clear   Cardiovascular: heart rate normal. No gallop  Back - normal inspection  Skin inspection:Warm  Musculoskeletal : No deformities  Neuro : Limited exam. Baseline    ROS: Review of symptoms is negative except for what is mentioned in HPI    Results/Data  Records including but not limited to electronic medical records, relevant lab work and imaging from patient's health care facility encounter were reviewed and independently verified      Charting was completed using voice recognition technology and may include unintended errors.    Discussed with patient/family, RN, and NP.      Electronically Signed By: Reddy Taylor MD   1/28/25  5:53 PM

## 2025-01-22 NOTE — PROGRESS NOTES
Augustina Castellon   74 y.o.  female  @location@            Assessment and Plan:      Moderate to advanced dementia  Past history of stroke  Hypertensive heart disease  CKD stage III  Type 2 diabetes  Paroxysmal A-fib  Osteoporosis    Continue alendronate for osteoporosis    Anticoagulated for A-fib with Eliquis.  Monitor for bleeding    On aspirin for past history of stroke.  Monitor neurological status    Hyperlipidemia-on Lipitor.  Monitor lipid panel    Continue memantine for dementia    Anxiety and depression-on Zoloft    Hypertension-controlled with Cardizem.  Monitor and treat accordingly    This patient was seen for my regular monthly visit, nursing evaluations and nursing notes were reviewed, interim events are reviewed, interim concerns and messages were reviewed as we have communicated with nursing staff.  Any issues with the falls, skin care impairment, declining physical condition are reviewed and noted, diagnosis list were reviewed, list of medications were reviewed, living will related issues were reviewed, overall patient has been doing well, any declining in patient's condition or any change in patient's condition needs to be notified to physician promptly, discussed with nursing staff, if needed would communicate with family.  Patient stays confined here at the facility for long-term care, there are always concerns about long-term care related issues and concerns.  Nursing staff is trying their best to keep patient safe, all sort of measures has been taken to keep patient safe and comfortable.             Source of history: Nurse, Medical personnel, Medical record, Patient.  History limitation: None.    HPI:      Patient is unable to give any detailed history and therefore history is obtained from the chart  No acute complaints voiced by the patient or acute concerns raised by nursing    Problem List/Past Medical History Ongoing Degenerative disc disease at L5-S1 level Hyperlipidemia Hypertension Iron  deficiency anemia Mild renal insufficiency Osteopenia of hip Postmenopausal status Type 2 diabetes mellitus Historical   No qualifying data Procedure/Surgical History Right carotid endarterectomy: 01/01/10  Cataracts removed bilaterally Medications alendronate 70 mg oral tablet, 70 mg= 1 tabs, ORAL, TUESDAY aspirin 81 mg oral tablet, DAILY Caltrate, ORAL, DAILY Cartia  mg/24 hours oral capsule, extended release, 240 mg= 1 caps, ORAL, DAILY, 1 refills Fish Oil oral capsule, DAILY Glucometer test strips for BID testing., See Instructions, 11 refills Lipitor 40 mg oral tablet, 40 mg= 1 tabs, ORAL, DAILY, 1 refills metFORMIN 500 mg oral tablet, 1 tabs, ORAL, BID multivitamin, ORAL, DAILY Vitamin B-12, ORAL, DAILY Vitamin D, ORAL, DAILY Allergies No Known Allergies Social History   Alcohol - Denies Alcohol Use, 11/21/2017   Domestic Concerns   No, 11/21/2017   Employment/School   Retired, Work/School description: ., 11/21/2017   Exercise   Exercise type: Aerobics., 11/21/2017   Home/Environment   Lives with Siblings. Living situation: Home/Independent. Home equipment: None, Blood pressure monitor. Home monitoring equipment: None. Special/Community resources: None. Mobility prior to admit: Independent. Home Barriers: None. Will patient require additional/new services upon discharge? No., 11/21/2017    Family History Alcoholism: Father. Alzheimer disease: Mother and Grandmother. Diabetes..: Mother. High blood pressure....: Brother. Immunizations   Vaccine Date Status Comments   pneumococcal 13-valent vaccine 03/16/2020 Given    influenza virus vaccine, inactivated 11/22/2019 Recorded    influenza virus vaccine, inactivated 11/20/2018 Recorded    diphtheria/pertussis, acel/tetanus adult 05/21/2018 Given exp date not right in system, exp date is June 13, 2020   influenza virus vaccine, inactivated 11/21/2017 Given    influenza virus vaccine, inactivated 12/19/2014 Recorded    .   Physical Exam:  Vital signs  as per nursing/MA documentation were reviewed  General appearance: Alert and in no acute distress  HEENT: Normal Inspection  Neck - Normal Inspection  Respiratory : No respiratory distress. Lungs are clear   Cardiovascular: heart rate normal. No gallop  Back - normal inspection  Skin inspection:Warm  Musculoskeletal : No deformities  Neuro : Limited exam. Baseline    ROS: Review of symptoms is negative except for what is mentioned in HPI    Results/Data  Records including but not limited to electronic medical records, relevant lab work and imaging from patient's health care facility encounter were reviewed and independently verified      Charting was completed using voice recognition technology and may include unintended errors.    Discussed with patient/family, RN, and NP.

## 2025-02-13 ENCOUNTER — NURSING HOME VISIT (OUTPATIENT)
Dept: POST ACUTE CARE | Facility: EXTERNAL LOCATION | Age: 75
End: 2025-02-13
Payer: COMMERCIAL

## 2025-02-13 DIAGNOSIS — E11.40 TYPE 2 DIABETES MELLITUS WITH DIABETIC NEUROPATHY, UNSPECIFIED WHETHER LONG TERM INSULIN USE (MULTI): ICD-10-CM

## 2025-02-13 DIAGNOSIS — F03.B0 MODERATE DEMENTIA, UNSPECIFIED DEMENTIA TYPE, UNSPECIFIED WHETHER BEHAVIORAL, PSYCHOTIC, OR MOOD DISTURBANCE OR ANXIETY: ICD-10-CM

## 2025-02-13 DIAGNOSIS — I43 CARDIOMYOPATHY, HYPERTENSIVE, BENIGN, WITHOUT HEART FAILURE: ICD-10-CM

## 2025-02-13 DIAGNOSIS — I63.9 CEREBROVASCULAR ACCIDENT (CVA), UNSPECIFIED MECHANISM (MULTI): Primary | ICD-10-CM

## 2025-02-13 DIAGNOSIS — I11.9 CARDIOMYOPATHY, HYPERTENSIVE, BENIGN, WITHOUT HEART FAILURE: ICD-10-CM

## 2025-02-13 NOTE — LETTER
Patient: Augustina Castellon  : 1950    Encounter Date: 2025    Augustina Castellon   74 y.o.  female  @location@            Assessment and Plan:      Moderate to advanced dementia  Past history of stroke  Hypertensive heart disease  CKD stage III  Type 2 diabetes  Paroxysmal A-fib  Osteoporosis    Continue alendronate for osteoporosis    Anticoagulated for A-fib with Eliquis.  Monitor for bleeding    On aspirin for past history of stroke.  Monitor neurological status    Hyperlipidemia-on Lipitor.  Monitor lipid panel    Continue memantine for dementia    Anxiety and depression-on Zoloft    Hypertension-controlled with Cardizem.  Monitor and treat accordingly    -Fall prevention    -Cognitive engagement    -Monitor and treat blood pressure    -Aggressive decubitus ulcer prevention.    -Bowel and bladder care    -Optimal nutrition and supplementation as needed    -GI  and DVT prophylaxis    -Symptom control    -Ambulation as tolerated    -Will follow          Source of history: Nurse, Medical personnel, Medical record, Patient.  History limitation: None.    HPI:      Patient is unable to give any detailed history and therefore history is obtained from the chart  No acute complaints voiced by the patient or acute concerns raised by nursing    Problem List/Past Medical History Ongoing Degenerative disc disease at L5-S1 level Hyperlipidemia Hypertension Iron deficiency anemia Mild renal insufficiency Osteopenia of hip Postmenopausal status Type 2 diabetes mellitus Historical   No qualifying data Procedure/Surgical History Right carotid endarterectomy: 01/01/10  Cataracts removed bilaterally Medications alendronate 70 mg oral tablet, 70 mg= 1 tabs, ORAL, TUESDAY aspirin 81 mg oral tablet, DAILY Caltrate, ORAL, DAILY Cartia  mg/24 hours oral capsule, extended release, 240 mg= 1 caps, ORAL, DAILY, 1 refills Fish Oil oral capsule, DAILY Glucometer test strips for BID testing., See Instructions, 11 refills Lipitor 40  mg oral tablet, 40 mg= 1 tabs, ORAL, DAILY, 1 refills metFORMIN 500 mg oral tablet, 1 tabs, ORAL, BID multivitamin, ORAL, DAILY Vitamin B-12, ORAL, DAILY Vitamin D, ORAL, DAILY Allergies No Known Allergies Social History   Alcohol - Denies Alcohol Use, 11/21/2017   Domestic Concerns   No, 11/21/2017   Employment/School   Retired, Work/School description: ., 11/21/2017   Exercise   Exercise type: Aerobics., 11/21/2017   Home/Environment   Lives with Siblings. Living situation: Home/Independent. Home equipment: None, Blood pressure monitor. Home monitoring equipment: None. Special/Community resources: None. Mobility prior to admit: Independent. Home Barriers: None. Will patient require additional/new services upon discharge? No., 11/21/2017    Family History Alcoholism: Father. Alzheimer disease: Mother and Grandmother. Diabetes..: Mother. High blood pressure....: Brother. Immunizations   Vaccine Date Status Comments   pneumococcal 13-valent vaccine 03/16/2020 Given    influenza virus vaccine, inactivated 11/22/2019 Recorded    influenza virus vaccine, inactivated 11/20/2018 Recorded    diphtheria/pertussis, acel/tetanus adult 05/21/2018 Given exp date not right in system, exp date is June 13, 2020   influenza virus vaccine, inactivated 11/21/2017 Given    influenza virus vaccine, inactivated 12/19/2014 Recorded    .   Physical Exam:  Vital signs as per nursing/MA documentation were reviewed  General appearance: Alert and in no acute distress  HEENT: Normal Inspection  Neck - Normal Inspection  Respiratory : No respiratory distress. Lungs are clear   Cardiovascular: heart rate normal. No gallop  Back - normal inspection  Skin inspection:Warm  Musculoskeletal : No deformities  Neuro : Limited exam. Baseline    ROS: Review of symptoms is negative except for what is mentioned in HPI    Results/Data  Records including but not limited to electronic medical records, relevant lab work and imaging from patient's  health care facility encounter were reviewed and independently verified      Charting was completed using voice recognition technology and may include unintended errors.    Discussed with patient/family, RN, and NP.      Electronically Signed By: Reddy Taylor MD   3/1/25  7:46 AM

## 2025-02-21 NOTE — PROGRESS NOTES
Augustina Castellon   74 y.o.  female  @location@            Assessment and Plan:      Moderate to advanced dementia  Past history of stroke  Hypertensive heart disease  CKD stage III  Type 2 diabetes  Paroxysmal A-fib  Osteoporosis    Continue alendronate for osteoporosis    Anticoagulated for A-fib with Eliquis.  Monitor for bleeding    On aspirin for past history of stroke.  Monitor neurological status    Hyperlipidemia-on Lipitor.  Monitor lipid panel    Continue memantine for dementia    Anxiety and depression-on Zoloft    Hypertension-controlled with Cardizem.  Monitor and treat accordingly    -Fall prevention    -Cognitive engagement    -Monitor and treat blood pressure    -Aggressive decubitus ulcer prevention.    -Bowel and bladder care    -Optimal nutrition and supplementation as needed    -GI  and DVT prophylaxis    -Symptom control    -Ambulation as tolerated    -Will follow          Source of history: Nurse, Medical personnel, Medical record, Patient.  History limitation: None.    HPI:      Patient is unable to give any detailed history and therefore history is obtained from the chart  No acute complaints voiced by the patient or acute concerns raised by nursing    Problem List/Past Medical History Ongoing Degenerative disc disease at L5-S1 level Hyperlipidemia Hypertension Iron deficiency anemia Mild renal insufficiency Osteopenia of hip Postmenopausal status Type 2 diabetes mellitus Historical   No qualifying data Procedure/Surgical History Right carotid endarterectomy: 01/01/10  Cataracts removed bilaterally Medications alendronate 70 mg oral tablet, 70 mg= 1 tabs, ORAL, TUESDAY aspirin 81 mg oral tablet, DAILY Caltrate, ORAL, DAILY Cartia  mg/24 hours oral capsule, extended release, 240 mg= 1 caps, ORAL, DAILY, 1 refills Fish Oil oral capsule, DAILY Glucometer test strips for BID testing., See Instructions, 11 refills Lipitor 40 mg oral tablet, 40 mg= 1 tabs, ORAL, DAILY, 1 refills metFORMIN 500 mg  oral tablet, 1 tabs, ORAL, BID multivitamin, ORAL, DAILY Vitamin B-12, ORAL, DAILY Vitamin D, ORAL, DAILY Allergies No Known Allergies Social History   Alcohol - Denies Alcohol Use, 11/21/2017   Domestic Concerns   No, 11/21/2017   Employment/School   Retired, Work/School description: ., 11/21/2017   Exercise   Exercise type: Aerobics., 11/21/2017   Home/Environment   Lives with Siblings. Living situation: Home/Independent. Home equipment: None, Blood pressure monitor. Home monitoring equipment: None. Special/Community resources: None. Mobility prior to admit: Independent. Home Barriers: None. Will patient require additional/new services upon discharge? No., 11/21/2017    Family History Alcoholism: Father. Alzheimer disease: Mother and Grandmother. Diabetes..: Mother. High blood pressure....: Brother. Immunizations   Vaccine Date Status Comments   pneumococcal 13-valent vaccine 03/16/2020 Given    influenza virus vaccine, inactivated 11/22/2019 Recorded    influenza virus vaccine, inactivated 11/20/2018 Recorded    diphtheria/pertussis, acel/tetanus adult 05/21/2018 Given exp date not right in system, exp date is June 13, 2020   influenza virus vaccine, inactivated 11/21/2017 Given    influenza virus vaccine, inactivated 12/19/2014 Recorded    .   Physical Exam:  Vital signs as per nursing/MA documentation were reviewed  General appearance: Alert and in no acute distress  HEENT: Normal Inspection  Neck - Normal Inspection  Respiratory : No respiratory distress. Lungs are clear   Cardiovascular: heart rate normal. No gallop  Back - normal inspection  Skin inspection:Warm  Musculoskeletal : No deformities  Neuro : Limited exam. Baseline    ROS: Review of symptoms is negative except for what is mentioned in HPI    Results/Data  Records including but not limited to electronic medical records, relevant lab work and imaging from patient's health care facility encounter were reviewed and independently  verified      Charting was completed using voice recognition technology and may include unintended errors.    Discussed with patient/family, RN, and NP.

## 2025-03-06 ENCOUNTER — HOME VISIT (OUTPATIENT)
Dept: POST ACUTE CARE | Facility: EXTERNAL LOCATION | Age: 75
End: 2025-03-06
Payer: COMMERCIAL

## 2025-03-06 DIAGNOSIS — I11.9 CARDIOMYOPATHY, HYPERTENSIVE, BENIGN, WITHOUT HEART FAILURE: Primary | ICD-10-CM

## 2025-03-06 DIAGNOSIS — I43 CARDIOMYOPATHY, HYPERTENSIVE, BENIGN, WITHOUT HEART FAILURE: Primary | ICD-10-CM

## 2025-03-06 DIAGNOSIS — E11.40 TYPE 2 DIABETES MELLITUS WITH DIABETIC NEUROPATHY, UNSPECIFIED WHETHER LONG TERM INSULIN USE (MULTI): ICD-10-CM

## 2025-03-06 DIAGNOSIS — F03.B0 MODERATE DEMENTIA, UNSPECIFIED DEMENTIA TYPE, UNSPECIFIED WHETHER BEHAVIORAL, PSYCHOTIC, OR MOOD DISTURBANCE OR ANXIETY: ICD-10-CM

## 2025-03-06 DIAGNOSIS — I48.91 ATRIAL FIBRILLATION, UNSPECIFIED TYPE (MULTI): ICD-10-CM

## 2025-03-06 PROCEDURE — 99349 HOME/RES VST EST MOD MDM 40: CPT | Performed by: EMERGENCY MEDICINE

## 2025-03-06 NOTE — PROGRESS NOTES
Augustina Cande   74 y.o.  female  @location@            Assessment and Plan:      Moderate to advanced dementia  Past history of stroke  Hypertensive heart disease  CKD stage III  Type 2 diabetes  Paroxysmal A-fib  Osteoporosis    Continue alendronate for osteoporosis    Anticoagulated for A-fib with Eliquis.  Monitor for bleeding    On aspirin for past history of stroke.  Monitor neurological status    Hyperlipidemia-on Lipitor.  Monitor lipid panel    Continue memantine for dementia    Anxiety and depression-on Zoloft    Hypertension-controlled with Cardizem.  Monitor and treat accordingly    Provide safe environment for the patient     Continue current medication regimen     OT PT and speech therapy     Bowel and bladder,skin care     Nutritional support     monitor and treat blood glucose     GI  and DVT prophylaxis     PRN medications     Periodic lab work    Regular Follow up    Charting is done using voice recognition software and may contain errors which have not been completely corrected      Source of history: Nurse, Medical personnel, Medical record, Patient.  History limitation: None.    HPI:      Patient is unable to give any detailed history and therefore history is obtained from the chart  No acute complaints voiced by the patient or acute concerns raised by nursing    Problem List/Past Medical History Ongoing Degenerative disc disease at L5-S1 level Hyperlipidemia Hypertension Iron deficiency anemia Mild renal insufficiency Osteopenia of hip Postmenopausal status Type 2 diabetes mellitus Historical   No qualifying data Procedure/Surgical History Right carotid endarterectomy: 01/01/10  Cataracts removed bilaterally Medications alendronate 70 mg oral tablet, 70 mg= 1 tabs, ORAL, TUESDAY aspirin 81 mg oral tablet, DAILY Caltrate, ORAL, DAILY Cartia  mg/24 hours oral capsule, extended release, 240 mg= 1 caps, ORAL, DAILY, 1 refills Fish Oil oral capsule, DAILY Glucometer test strips for BID testing.,  See Instructions, 11 refills Lipitor 40 mg oral tablet, 40 mg= 1 tabs, ORAL, DAILY, 1 refills metFORMIN 500 mg oral tablet, 1 tabs, ORAL, BID multivitamin, ORAL, DAILY Vitamin B-12, ORAL, DAILY Vitamin D, ORAL, DAILY Allergies No Known Allergies Social History   Alcohol - Denies Alcohol Use, 11/21/2017   Domestic Concerns   No, 11/21/2017   Employment/School   Retired, Work/School description: ., 11/21/2017   Exercise   Exercise type: Aerobics., 11/21/2017   Home/Environment   Lives with Siblings. Living situation: Home/Independent. Home equipment: None, Blood pressure monitor. Home monitoring equipment: None. Special/Community resources: None. Mobility prior to admit: Independent. Home Barriers: None. Will patient require additional/new services upon discharge? No., 11/21/2017    Family History Alcoholism: Father. Alzheimer disease: Mother and Grandmother. Diabetes..: Mother. High blood pressure....: Brother. Immunizations   Vaccine Date Status Comments   pneumococcal 13-valent vaccine 03/16/2020 Given    influenza virus vaccine, inactivated 11/22/2019 Recorded    influenza virus vaccine, inactivated 11/20/2018 Recorded    diphtheria/pertussis, acel/tetanus adult 05/21/2018 Given exp date not right in system, exp date is June 13, 2020   influenza virus vaccine, inactivated 11/21/2017 Given    influenza virus vaccine, inactivated 12/19/2014 Recorded    .   Physical Exam:  Vital signs as per nursing/MA documentation were reviewed  General appearance: Alert and in no acute distress  HEENT: Normal Inspection  Neck - Normal Inspection  Respiratory : No respiratory distress. Lungs are clear   Cardiovascular: heart rate normal. No gallop  Back - normal inspection  Skin inspection:Warm  Musculoskeletal : No deformities  Neuro : Limited exam. Baseline    ROS: Review of symptoms is negative except for what is mentioned in HPI    Results/Data  Records including but not limited to electronic medical records, relevant  lab work and imaging from patient's health care facility encounter were reviewed and independently verified      Charting was completed using voice recognition technology and may include unintended errors.    Discussed with patient/family, RN, and NP.

## 2025-04-15 ENCOUNTER — HOME VISIT (OUTPATIENT)
Dept: POST ACUTE CARE | Facility: EXTERNAL LOCATION | Age: 75
End: 2025-04-15
Payer: COMMERCIAL

## 2025-04-15 DIAGNOSIS — I48.91 ATRIAL FIBRILLATION, UNSPECIFIED TYPE (MULTI): ICD-10-CM

## 2025-04-15 DIAGNOSIS — F03.90 ADVANCING DEMENTIA (MULTI): ICD-10-CM

## 2025-04-15 DIAGNOSIS — E11.40 TYPE 2 DIABETES MELLITUS WITH DIABETIC NEUROPATHY, UNSPECIFIED WHETHER LONG TERM INSULIN USE (MULTI): Primary | ICD-10-CM

## 2025-04-15 DIAGNOSIS — N18.30 STAGE 3 CHRONIC KIDNEY DISEASE, UNSPECIFIED WHETHER STAGE 3A OR 3B CKD (MULTI): ICD-10-CM

## 2025-04-15 PROCEDURE — 99348 HOME/RES VST EST LOW MDM 30: CPT | Performed by: EMERGENCY MEDICINE

## 2025-04-27 PROCEDURE — 99223 1ST HOSP IP/OBS HIGH 75: CPT | Performed by: FAMILY MEDICINE

## 2025-05-08 ENCOUNTER — DOCUMENTATION (OUTPATIENT)
Dept: POST ACUTE CARE | Facility: EXTERNAL LOCATION | Age: 75
End: 2025-05-08

## 2025-05-15 ENCOUNTER — HOME VISIT (OUTPATIENT)
Dept: POST ACUTE CARE | Facility: EXTERNAL LOCATION | Age: 75
End: 2025-05-15
Payer: MEDICARE

## 2025-05-15 DIAGNOSIS — I15.2 HYPERTENSION ASSOCIATED WITH DIABETES: ICD-10-CM

## 2025-05-15 DIAGNOSIS — E03.9 HYPOTHYROIDISM, UNSPECIFIED TYPE: ICD-10-CM

## 2025-05-15 DIAGNOSIS — F03.918 DEMENTIA WITH BEHAVIORAL DISTURBANCE (MULTI): ICD-10-CM

## 2025-05-15 DIAGNOSIS — Z76.89 ENCOUNTER FOR SUPPORT AND COORDINATION OF TRANSITION OF CARE: Primary | ICD-10-CM

## 2025-05-15 DIAGNOSIS — I48.91 ATRIAL FIBRILLATION, UNSPECIFIED TYPE (MULTI): ICD-10-CM

## 2025-05-15 DIAGNOSIS — E11.59 HYPERTENSION ASSOCIATED WITH DIABETES: ICD-10-CM

## 2025-05-15 DIAGNOSIS — N18.30 STAGE 3 CHRONIC KIDNEY DISEASE, UNSPECIFIED WHETHER STAGE 3A OR 3B CKD (MULTI): ICD-10-CM

## 2025-06-03 ENCOUNTER — HOME VISIT (OUTPATIENT)
Dept: POST ACUTE CARE | Facility: EXTERNAL LOCATION | Age: 75
End: 2025-06-03
Payer: MEDICARE

## 2025-06-03 DIAGNOSIS — F03.918 DEMENTIA WITH BEHAVIORAL DISTURBANCE (MULTI): ICD-10-CM

## 2025-06-03 DIAGNOSIS — E11.59 HYPERTENSION ASSOCIATED WITH DIABETES: ICD-10-CM

## 2025-06-03 DIAGNOSIS — N18.30 STAGE 3 CHRONIC KIDNEY DISEASE, UNSPECIFIED WHETHER STAGE 3A OR 3B CKD (MULTI): Primary | ICD-10-CM

## 2025-06-03 DIAGNOSIS — I15.2 HYPERTENSION ASSOCIATED WITH DIABETES: ICD-10-CM

## 2025-06-03 DIAGNOSIS — I48.91 ATRIAL FIBRILLATION, UNSPECIFIED TYPE (MULTI): ICD-10-CM

## 2025-06-13 PROBLEM — E11.59 HYPERTENSION ASSOCIATED WITH DIABETES: Status: ACTIVE | Noted: 2025-06-13

## 2025-06-13 PROBLEM — F03.918 DEMENTIA WITH BEHAVIORAL DISTURBANCE (MULTI): Status: ACTIVE | Noted: 2024-07-16

## 2025-06-13 PROBLEM — I15.2 HYPERTENSION ASSOCIATED WITH DIABETES: Status: ACTIVE | Noted: 2025-06-13

## 2025-06-13 PROBLEM — E03.9 HYPOTHYROIDISM: Status: ACTIVE | Noted: 2025-06-13

## 2025-06-13 NOTE — PROGRESS NOTES
Augustina Castellon   74 y.o.  female  @location@            Assessment and Plan:  Transition of care    Diagnosis:  Impulse control disorder  Psychosis, unspecified  Dementia, severe, with behavioral disturbance  UTI  Hypertension  Hypothyroid  TIA  Osteoarthritis  Hyperlipidemia    Continue current treatment  Endocrine managing elevated TSH  Treatment of behavioral issues as per psychiatry  Is on Depakote, Remeron and Ativan  Other lab work is unremarkable  Continue baseline meds for chronic issues  GI and DVT prophylaxis  We will follow           Hospital Course:  The patient was admitted to the [facility] and was involved in individual and group therapies, recreational and occupational therapies.      Early in Seble's hospitalization, she would frequently wander around the unit and require redirection due to intrusive behaviors.  She experienced hallucinations that she would verbally and physically respond to.  She would argue and yell out at unseen others.  She hit her hand off of tables when screaming out at the hallucinations.  She consistently presented as severely cognitively impaired.  Often she did not offer any verbal responses, instead nodding her head or smiling.    Seble was admitted seeking Seroquel, which was not adequately managing her symptoms.  This was discontinued in favor of Risperdal, which has been more beneficial overall for management of psychosis and agitation.  She did not tolerate higher dosing as this caused some lethargy and is doing well on an afternoon and evening dose.  Additionally Aricept was discontinued due to lack of benefit in her advanced state of dementia.  Overall medication management has led to significant improvement in behaviors.  She will occasionally respond to hallucinations, but certainly is not as distressed by them.  She is more directable.  She is wandering less.  She continues to present as profoundly impaired.    Due to cognitive impairment, Bonita  remain a chronic risk for impulsive behaviors that could lead to harm to self or to others, which is consistent with her diagnosis of dementia.  Bonita is too cognitively impaired to make any premeditated plans for self to harm or harm to others and any such behaviors would be impulsive in nature.    In the absence of any unmanageable behaviors, arrangements have been made for Seble to be discharged back to [facility] today where she will follow-up with providers at the facility.    Source of history: Nurse, Medical personnel, Medical record, Patient.  History limitation: None.    HPI:  Transitional care management    Patient was discharged from [facility] on May 6_ and there was interactive contact by patient/family or facility staff on behalf of patient with me/office within 2 working days regarding patient's transition    Patient was recently admitted to the hospital.  Patient's history regarding the reason for hospital admission and the course in the hospital was reviewed with patinet and/or family.  Patient's medical records including lab work, radiology and other medical notes were reviewed.  His medication list prior to admission and discharge medication list are compared and updated.    Patient encounter was done face-to-face    Patient is unable to give detailed history and therefore history is obtained from the chart    History from hospitalization-      74-year-old admitted from [facility] with acute agitation.  Patient has underlying dementia patient has history of CKD stroke A-fib hypertension hyperlipidemia dementia diabetes patient was admitted to [facility] psych floor.  Problem List/Past Medical History Ongoing Degenerative disc disease at L5-S1 level Finger dislocation Hyperlipidemia Hypertension Iron deficiency anemia Mild renal insufficiency Osteopenia of hip Postmenopausal status Type 2 diabetes mellitus Procedure/Surgical History   Right carotid  endarterectomy (01/01/2010)   Cataracts removed bilaterally  Allergies No Known Allergies Social History   Alcohol - Denies Alcohol Use   Domestic Concerns Feels highly stressed:No   Employment/School Status:Retired Description:   Exercise Exercise type:Aerobics   Home/Environment Lives with:Siblings *Living Situation Prior to AdmissionHome/Independent Home equipment:None, Blood pressure monitor Monitoring Equipment in homeNone *Special Services and Community Resources Prior to AdmissionNone *Mobility Assistance Prior to AdmissionIndependent Home BarriersNone *Will patient require additional/new services upon discharge?No   Sexual Sexually active:No What is your current gender identity? (Check all that apply)Identifies as female   Substance Abuse - Denies Substance Abuse   Tobacco Use:Never (less than 100 in lifetime) Family History Alcoholism: Father. Alzheimer disease: Mother and Grandmother. Diabetes..: Mother. High blood pressure....: Brother.  Current Medications[1]    Physical Exam:  Vital signs as per nursing/MA documentation were reviewed  General appearance: Alert and in no acute distress  HEENT: Normal Inspection  Neck - Normal Inspection  Respiratory : No respiratory distress. Lungs are clear   Cardiovascular: heart rate normal. No gallop  Back - normal inspection  Skin inspection:Warm  Musculoskeletal : No deformities  Neuro : Limited exam. Baseline    ROS: Review of symptoms is negative except for what is mentioned in HPI    Results/Data  Records including but not limited to electronic medical records, relevant lab work and imaging from patient's health care facility encounter were reviewed and independently verified      Charting was completed using voice recognition technology and may include unintended errors.    Discussed with patient/family, RN, and NP.       [1]   No current outpatient medications on file.     No current facility-administered medications for this visit.

## 2025-06-13 NOTE — PROGRESS NOTES
Augustina Castellon   74 y.o.  female  @location@            Assessment and Plan:      Diagnosis:  Impulse control disorder  Psychosis, unspecified  Dementia, severe, with behavioral disturbance  UTI  Hypertension  Hypothyroid  TIA  Osteoarthritis  Hyperlipidemia    Continue current treatment  Endocrine managing elevated TSH  Treatment of behavioral issues as per psychiatry  Is on Depakote, Remeron and Ativan  Other lab work is unremarkable  Continue baseline meds for chronic issues  GI and DVT prophylaxis  We will follow           Hospital Course:  The patient was [facility] to the [facility] and was involved in individual and group therapies, recreational and occupational therapies.      Early in Seble's hospitalization, she would frequently wander around the unit and require redirection due to intrusive behaviors.  She experienced hallucinations that she would verbally and physically respond to.  She would argue and yell out at unseen others.  She hit her hand off of tables when screaming out at the hallucinations.  She consistently presented as severely cognitively impaired.  Often she did not offer any verbal responses, instead nodding her head or smiling.    Seble was admitted seeking Seroquel, which was not adequately managing her symptoms.  This was discontinued in favor of Risperdal, which has been more beneficial overall for management of psychosis and agitation.  She did not tolerate higher dosing as this caused some lethargy and is doing well on an afternoon and evening dose.  Additionally Aricept was discontinued due to lack of benefit in her advanced state of dementia.  Overall medication management has led to significant improvement in behaviors.  She will occasionally respond to hallucinations, but certainly is not as distressed by them.  She is more directable.  She is wandering less.  She continues to present as profoundly impaired.    Due to cognitive impairment, Bonita remain a chronic  risk for impulsive behaviors that could lead to harm to self or to others, which is consistent with her diagnosis of dementia.  Bonita is too cognitively impaired to make any premeditated plans for self to harm or harm to others and any such behaviors would be impulsive in nature.    In the absence of any unmanageable behaviors, arrangements have been made for Seble to be discharged back to [facility] today where she will follow-up with providers at the facility.    Source of history: Nurse, Medical personnel, Medical record, Patient.  History limitation: None.    HPI:    Patient is unable to give detailed history and therefore history is obtained from the chart    History from hospitalization-      74-year-old admitted from [facility] with acute agitation.  Patient has underlying dementia patient has history of CKD stroke A-fib hypertension hyperlipidemia dementia diabetes patient was admitted to [facility] psych floor.  Problem List/Past Medical History Ongoing Degenerative disc disease at L5-S1 level Finger dislocation Hyperlipidemia Hypertension Iron deficiency anemia Mild renal insufficiency Osteopenia of hip Postmenopausal status Type 2 diabetes mellitus Procedure/Surgical History   Right carotid endarterectomy (01/01/2010)   Cataracts removed bilaterally  Allergies No Known Allergies Social History   Alcohol - Denies Alcohol Use   Domestic Concerns Feels highly stressed:No   Employment/School Status:Retired Description:   Exercise Exercise type:Aerobics   Home/Environment Lives with:Siblings *Living Situation Prior to AdmissionHome/Independent Home equipment:None, Blood pressure monitor Monitoring Equipment in homeNone *Special Services and Community Resources Prior to AdmissionNone *Mobility Assistance Prior to AdmissionIndependent Home BarriersNone *Will patient require additional/new services upon discharge?No   Sexual Sexually active:No What is your current gender identity?  (Check all that apply)Identifies as female   Substance Abuse - Denies Substance Abuse   Tobacco Use:Never (less than 100 in lifetime) Family History Alcoholism: Father. Alzheimer disease: Mother and Grandmother. Diabetes..: Mother. High blood pressure....: Brother.  Current Medications[1]    Physical Exam:  Vital signs as per nursing/MA documentation were reviewed  General appearance: Alert and in no acute distress  HEENT: Normal Inspection  Neck - Normal Inspection  Respiratory : No respiratory distress. Lungs are clear   Cardiovascular: heart rate normal. No gallop  Back - normal inspection  Skin inspection:Warm  Musculoskeletal : No deformities  Neuro : Limited exam. Baseline    ROS: Review of symptoms is negative except for what is mentioned in HPI    Results/Data  Records including but not limited to electronic medical records, relevant lab work and imaging from patient's health care facility encounter were reviewed and independently verified      Charting was completed using voice recognition technology and may include unintended errors.    Discussed with patient/family, RN, and NP.         [1]   No current outpatient medications on file.     No current facility-administered medications for this visit.

## 2025-06-13 NOTE — PROGRESS NOTES
Patient was discharged from [facility] on May 6_ and there was interactive contact by me/office with patient/family or facility staff on behalf of patient  on a phone call or text messaging within 2 working days regarding patient's transition.  Patient's hospital admission, diagnosis and the medications were discussed and the fact the patient needs to be seen ASAP for transition of care.

## 2025-07-08 ENCOUNTER — HOME VISIT (OUTPATIENT)
Dept: POST ACUTE CARE | Facility: EXTERNAL LOCATION | Age: 75
End: 2025-07-08
Payer: MEDICARE

## 2025-07-08 DIAGNOSIS — I48.91 ATRIAL FIBRILLATION, UNSPECIFIED TYPE (MULTI): ICD-10-CM

## 2025-07-08 DIAGNOSIS — E11.40 TYPE 2 DIABETES MELLITUS WITH DIABETIC NEUROPATHY, UNSPECIFIED WHETHER LONG TERM INSULIN USE (MULTI): Primary | ICD-10-CM

## 2025-07-08 DIAGNOSIS — F03.918 DEMENTIA WITH BEHAVIORAL DISTURBANCE (MULTI): ICD-10-CM

## 2025-07-08 DIAGNOSIS — I63.9 CEREBROVASCULAR ACCIDENT (CVA), UNSPECIFIED MECHANISM (MULTI): ICD-10-CM

## 2025-07-08 PROCEDURE — 99349 HOME/RES VST EST MOD MDM 40: CPT | Performed by: EMERGENCY MEDICINE

## 2025-07-13 NOTE — PROGRESS NOTES
Augustina Castellon   74 y.o.  female  @location@            Assessment and Plan:      Diagnosis:  Impulse control disorder  Psychosis, unspecified  Dementia, severe, with behavioral disturbance  UTI  Hypertension  Hypothyroid  TIA  Osteoarthritis  Hyperlipidemia    Continue current treatment  Endocrine managing elevated TSH  Treatment of behavioral issues as per psychiatry  Is on Depakote, Remeron and Ativan  Other lab work is unremarkable  Continue baseline meds for chronic issues  GI and DVT prophylaxis  We will follow           Hospital Course:  The patient was admitted to the [facility] Unit and was involved in individual and group therapies, recreational and occupational therapies.      Early in Seble's hospitalization, she would frequently wander around the unit and require redirection due to intrusive behaviors.  She experienced hallucinations that she would verbally and physically respond to.  She would argue and yell out at unseen others.  She hit her hand off of tables when screaming out at the hallucinations.  She consistently presented as severely cognitively impaired.  Often she did not offer any verbal responses, instead nodding her head or smiling.    Seble was admitted seeking Seroquel, which was not adequately managing her symptoms.  This was discontinued in favor of Risperdal, which has been more beneficial overall for management of psychosis and agitation.  She did not tolerate higher dosing as this caused some lethargy and is doing well on an afternoon and evening dose.  Additionally Aricept was discontinued due to lack of benefit in her advanced state of dementia.  Overall medication management has led to significant improvement in behaviors.  She will occasionally respond to hallucinations, but certainly is not as distressed by them.  She is more directable.  She is wandering less.  She continues to present as profoundly impaired.    Due to cognitive impairment, Bonita remain a chronic  risk for impulsive behaviors that could lead to harm to self or to others, which is consistent with her diagnosis of dementia.  Bonita is too cognitively impaired to make any premeditated plans for self to harm or harm to others and any such behaviors would be impulsive in nature.    In the absence of any unmanageable behaviors, arrangements have been made for Seble to be discharged back to [facility] where she will follow-up with providers at the facility.    -Fall prevention    -Cognitive engagement     -Monitor and treat blood pressure     -Aggressive decubitus ulcer prevention.     -Bowel and bladder care     -Optimal nutrition and supplementation as needed     -GI  and DVT prophylaxis     -Symptom control     -Ambulation as tolerated     -Will follow    Charting is done using voice recognition software and may contain errors which have not been completely corrected    Source of history: Nurse, Medical personnel, Medical record, Patient.  History limitation: None.    HPI:    Patient is unable to give detailed history and therefore history is obtained from the chart    History from hospitalization-      74-year-old admitted from [facility] with acute agitation.  Patient has underlying dementia patient has history of CKD stroke A-fib hypertension hyperlipidemia dementia diabetes patient was admitted to [facility] psych floor.  Problem List/Past Medical History Ongoing Degenerative disc disease at L5-S1 level Finger dislocation Hyperlipidemia Hypertension Iron deficiency anemia Mild renal insufficiency Osteopenia of hip Postmenopausal status Type 2 diabetes mellitus Procedure/Surgical History   Right carotid endarterectomy (01/01/2010)   Cataracts removed bilaterally  Allergies No Known Allergies Social History   Alcohol - Denies Alcohol Use   Domestic Concerns Feels highly stressed:No   Employment/School Status:Retired Description:   Exercise Exercise type:Aerobics    Home/Environment Lives with:Siblings *Living Situation Prior to AdmissionHome/Independent Home equipment:None, Blood pressure monitor Monitoring Equipment in homeNone *Special Services and Community Resources Prior to AdmissionNone *Mobility Assistance Prior to AdmissionIndependent Home BarriersNone *Will patient require additional/new services upon discharge?No   Sexual Sexually active:No What is your current gender identity? (Check all that apply)Identifies as female   Substance Abuse - Denies Substance Abuse   Tobacco Use:Never (less than 100 in lifetime) Family History Alcoholism: Father. Alzheimer disease: Mother and Grandmother. Diabetes..: Mother. High blood pressure....: Brother.  Current Medications[1]    Physical Exam:  Vital signs as per nursing/MA documentation were reviewed  General appearance: Alert and in no acute distress  HEENT: Normal Inspection  Neck - Normal Inspection  Respiratory : No respiratory distress. Lungs are clear   Cardiovascular: heart rate normal. No gallop  Back - normal inspection  Skin inspection:Warm  Musculoskeletal : No deformities  Neuro : Limited exam. Baseline    ROS: Review of symptoms is negative except for what is mentioned in HPI    Results/Data  Records including but not limited to electronic medical records, relevant lab work and imaging from patient's health care facility encounter were reviewed and independently verified      Charting was completed using voice recognition technology and may include unintended errors.    Discussed with patient/family, RN, and NP.         [1]   No current outpatient medications on file.     No current facility-administered medications for this visit.

## 2025-08-07 ENCOUNTER — HOME VISIT (OUTPATIENT)
Dept: POST ACUTE CARE | Facility: EXTERNAL LOCATION | Age: 75
End: 2025-08-07
Payer: MEDICARE

## 2025-08-07 DIAGNOSIS — I63.9 CEREBROVASCULAR ACCIDENT (CVA), UNSPECIFIED MECHANISM (MULTI): ICD-10-CM

## 2025-08-07 DIAGNOSIS — F03.918 DEMENTIA WITH BEHAVIORAL DISTURBANCE (MULTI): ICD-10-CM

## 2025-08-07 DIAGNOSIS — E03.9 HYPOTHYROIDISM, UNSPECIFIED TYPE: ICD-10-CM

## 2025-08-07 DIAGNOSIS — N18.30 STAGE 3 CHRONIC KIDNEY DISEASE, UNSPECIFIED WHETHER STAGE 3A OR 3B CKD (MULTI): Primary | ICD-10-CM

## 2025-08-07 PROCEDURE — 99348 HOME/RES VST EST LOW MDM 30: CPT | Performed by: EMERGENCY MEDICINE

## 2025-08-07 NOTE — PROGRESS NOTES
Augustina Castellon   74 y.o.  female  @location@            Assessment and Plan:      Diagnosis:  Impulse control disorder  Psychosis, unspecified  Dementia, severe, with behavioral disturbance  UTI  Hypertension  Hypothyroid  TIA  Osteoarthritis  Hyperlipidemia    Continue current treatment  Endocrine managing elevated TSH  Treatment of behavioral issues as per psychiatry  Is on Depakote, Remeron and Ativan  Other lab work is unremarkable  Continue baseline meds for chronic issues  GI and DVT prophylaxis  We will follow           Hospital Course:  The patient was admitted to the [facility] Unit and was involved in individual and group therapies, recreational and occupational therapies.      Early in Seble's hospitalization, she would frequently wander around the unit and require redirection due to intrusive behaviors.  She experienced hallucinations that she would verbally and physically respond to.  She would argue and yell out at unseen others.  She hit her hand off of tables when screaming out at the hallucinations.  She consistently presented as severely cognitively impaired.  Often she did not offer any verbal responses, instead nodding her head or smiling.    Seble was admitted seeking Seroquel, which was not adequately managing her symptoms.  This was discontinued in favor of Risperdal, which has been more beneficial overall for management of psychosis and agitation.  She did not tolerate higher dosing as this caused some lethargy and is doing well on an afternoon and evening dose.  Additionally Aricept was discontinued due to lack of benefit in her advanced state of dementia.  Overall medication management has led to significant improvement in behaviors.  She will occasionally respond to hallucinations, but certainly is not as distressed by them.  She is more directable.  She is wandering less.  She continues to present as profoundly impaired.    Due to cognitive impairment, Bonita remain a chronic  risk for impulsive behaviors that could lead to harm to self or to others, which is consistent with her diagnosis of dementia.  Bonita is too cognitively impaired to make any premeditated plans for self to harm or harm to others and any such behaviors would be impulsive in nature.    In the absence of any unmanageable behaviors, arrangements have been made for Seble to be discharged back to [facility] where she will follow-up with providers at the facility.    Rx list reviewed.   PT and OT evaluation is in the process.   Routine safety measures, fall precautions, risk modification and alarm placement if needed for prevention of falls.   Skin care precautions, prevention of pressures sores at pressure points assessed.   Pt needs to be monitored frequently by nursing staff particularly at night time.   Any confusion, agitation or behavioural disturbance needs to be attended, as per home policy   rapid covid Ag assay need to be done, notify if positive.   If needed appropriate measures to be taken for alarm placements and assisted devices, pt was told not to get up and ambulate at night unless help and assist available at bedside,   labs will be done as per our routine protocol.   PO intake need to be monitored if consuming po.       Charting is done using voice recognition software and may contain errors which have not been completely corrected      Source of history: Nurse, Medical personnel, Medical record, Patient.  History limitation: None.    HPI:    Patient is unable to give detailed history and therefore history is obtained from the chart    History from hospitalization-      74-year-old admitted from [facility] with acute agitation.  Patient has underlying dementia patient has history of CKD stroke A-fib hypertension hyperlipidemia dementia diabetes patient was admitted to [facility] psych floor.  Problem List/Past Medical History Ongoing Degenerative disc disease at L5-S1 level  Finger dislocation Hyperlipidemia Hypertension Iron deficiency anemia Mild renal insufficiency Osteopenia of hip Postmenopausal status Type 2 diabetes mellitus Procedure/Surgical History   Right carotid endarterectomy (01/01/2010)   Cataracts removed bilaterally  Allergies No Known Allergies Social History   Alcohol - Denies Alcohol Use   Domestic Concerns Feels highly stressed:No   Employment/School Status:Retired Description:   Exercise Exercise type:Aerobics   Home/Environment Lives with:Siblings *Living Situation Prior to AdmissionHome/Independent Home equipment:None, Blood pressure monitor Monitoring Equipment in homeNone *Special Services and Community Resources Prior to AdmissionNone *Mobility Assistance Prior to AdmissionIndependent Home BarriersNone *Will patient require additional/new services upon discharge?No   Sexual Sexually active:No What is your current gender identity? (Check all that apply)Identifies as female   Substance Abuse - Denies Substance Abuse   Tobacco Use:Never (less than 100 in lifetime) Family History Alcoholism: Father. Alzheimer disease: Mother and Grandmother. Diabetes..: Mother. High blood pressure....: Brother.  Current Medications[1]    Physical Exam:  Vital signs as per nursing/MA documentation were reviewed  General appearance: Alert and in no acute distress  HEENT: Normal Inspection  Neck - Normal Inspection  Respiratory : No respiratory distress. Lungs are clear   Cardiovascular: heart rate normal. No gallop  Back - normal inspection  Skin inspection:Warm  Musculoskeletal : No deformities  Neuro : Limited exam. Baseline    ROS: Review of symptoms is negative except for what is mentioned in HPI    Results/Data  Records including but not limited to electronic medical records, relevant lab work and imaging from patient's health care facility encounter were reviewed and independently verified      Charting was completed using voice recognition technology and may include  unintended errors.    Discussed with patient/family, RN, and NP.         [1]   No current outpatient medications on file.     No current facility-administered medications for this visit.